# Patient Record
Sex: MALE | Race: OTHER
[De-identification: names, ages, dates, MRNs, and addresses within clinical notes are randomized per-mention and may not be internally consistent; named-entity substitution may affect disease eponyms.]

---

## 2020-06-10 ENCOUNTER — HOSPITAL ENCOUNTER (EMERGENCY)
Dept: HOSPITAL 76 - ED | Age: 65
LOS: 1 days | Discharge: HOME | End: 2020-06-11
Payer: COMMERCIAL

## 2020-06-10 ENCOUNTER — HOSPITAL ENCOUNTER (OUTPATIENT)
Dept: HOSPITAL 76 - EMS | Age: 65
Discharge: ANOTHER HOSPITAL | End: 2020-06-10
Attending: SURGERY
Payer: COMMERCIAL

## 2020-06-10 DIAGNOSIS — R41.82: Primary | ICD-10-CM

## 2020-06-10 DIAGNOSIS — R11.2: ICD-10-CM

## 2020-06-10 DIAGNOSIS — I10: ICD-10-CM

## 2020-06-10 DIAGNOSIS — R00.0: ICD-10-CM

## 2020-06-10 DIAGNOSIS — F12.921: Primary | ICD-10-CM

## 2020-06-10 DIAGNOSIS — I44.4: ICD-10-CM

## 2020-06-10 DIAGNOSIS — I49.1: ICD-10-CM

## 2020-06-10 LAB
ALBUMIN DIAFP-MCNC: 4.5 G/DL (ref 3.2–5.5)
ALBUMIN/GLOB SERPL: 1.6 {RATIO} (ref 1–2.2)
ALP SERPL-CCNC: 45 IU/L (ref 42–121)
ALT SERPL W P-5'-P-CCNC: 24 IU/L (ref 10–60)
ANION GAP SERPL CALCULATED.4IONS-SCNC: 10 MMOL/L (ref 6–13)
AST SERPL W P-5'-P-CCNC: 30 IU/L (ref 10–42)
BASOPHILS NFR BLD AUTO: 0.1 10^3/UL (ref 0–0.1)
BASOPHILS NFR BLD AUTO: 0.4 %
BILIRUB BLD-MCNC: 0.5 MG/DL (ref 0.2–1)
BUN SERPL-MCNC: 15 MG/DL (ref 6–20)
CALCIUM UR-MCNC: 9.1 MG/DL (ref 8.5–10.3)
CHLORIDE SERPL-SCNC: 102 MMOL/L (ref 101–111)
CO2 SERPL-SCNC: 27 MMOL/L (ref 21–32)
CREAT SERPLBLD-SCNC: 1.1 MG/DL (ref 0.6–1.2)
EOSINOPHIL # BLD AUTO: 0.1 10^3/UL (ref 0–0.7)
EOSINOPHIL NFR BLD AUTO: 0.9 %
ERYTHROCYTE [DISTWIDTH] IN BLOOD BY AUTOMATED COUNT: 13.7 % (ref 12–15)
GLOBULIN SER-MCNC: 2.9 G/DL (ref 2.1–4.2)
GLUCOSE SERPL-MCNC: 228 MG/DL (ref 70–100)
HGB UR QL STRIP: 15.1 G/DL (ref 14–18)
LIPASE SERPL-CCNC: 22 U/L (ref 22–51)
LYMPHOCYTES # SPEC AUTO: 2.1 10^3/UL (ref 1.5–3.5)
LYMPHOCYTES NFR BLD AUTO: 17.7 %
MCH RBC QN AUTO: 31.5 PG (ref 27–31)
MCHC RBC AUTO-ENTMCNC: 33.9 G/DL (ref 32–36)
MCV RBC AUTO: 92.9 FL (ref 80–94)
MONOCYTES # BLD AUTO: 0.7 10^3/UL (ref 0–1)
MONOCYTES NFR BLD AUTO: 5.9 %
NEUTROPHILS # BLD AUTO: 8.6 10^3/UL (ref 1.5–6.6)
NEUTROPHILS # SNV AUTO: 11.6 X10^3/UL (ref 4.8–10.8)
NEUTROPHILS NFR BLD AUTO: 74.4 %
PDW BLD AUTO: 10.1 FL (ref 7.4–11.4)
PLATELET # BLD: 251 10^3/UL (ref 130–450)
PROT SPEC-MCNC: 7.4 G/DL (ref 6.7–8.2)
RBC MAR: 4.8 10^6/UL (ref 4.7–6.1)
SODIUM SERPLBLD-SCNC: 139 MMOL/L (ref 135–145)

## 2020-06-10 PROCEDURE — 99284 EMERGENCY DEPT VISIT MOD MDM: CPT

## 2020-06-10 PROCEDURE — 80053 COMPREHEN METABOLIC PANEL: CPT

## 2020-06-10 PROCEDURE — 83690 ASSAY OF LIPASE: CPT

## 2020-06-10 PROCEDURE — 84484 ASSAY OF TROPONIN QUANT: CPT

## 2020-06-10 PROCEDURE — 93005 ELECTROCARDIOGRAM TRACING: CPT

## 2020-06-10 PROCEDURE — 36415 COLL VENOUS BLD VENIPUNCTURE: CPT

## 2020-06-10 PROCEDURE — 96361 HYDRATE IV INFUSION ADD-ON: CPT

## 2020-06-10 PROCEDURE — 85025 COMPLETE CBC W/AUTO DIFF WBC: CPT

## 2020-06-10 PROCEDURE — 96360 HYDRATION IV INFUSION INIT: CPT

## 2020-06-10 NOTE — ED PHYSICIAN DOCUMENTATION
History of Present Illness





- Stated complaint


Stated Complaint: N/V





- Chief complaint


Chief Complaint: General





- History obtained from


History obtained from: Patient, Family, EMS





- History of Present Illness


Timing: Today





- Additonal information


Additional information: 





65-year-old male began to feel quite abnormal began to have excessive 

diaphoresis and difficulty concentrating and he eventually vomited.  He called 

his friend Dr. Hammond and when he did not have improvement with hydration Dr. Hammond came to evaluate the patient and found him to be diaphoretic and not 

thinking clearly.  A granddaughter present indicated that she had made some 

cannabis brownies usually containing about 200 mg of THC and one brownie.  She 

states she eats 1/4 of one of these brownies and her father unknowingly ate the 

entire brownie.  He does not have experience with cannabis or other drugs.  He 

does not feel well.





Review of Systems


Constitutional: reports: Fatigue, Sweats.  denies: Fever


Ears: denies: Ear pain


Nose: denies: Congestion


Throat: denies: Sore throat


Cardiac: denies: Chest pain / pressure, Palpitations


Respiratory: denies: Dyspnea, Cough


GI: reports: Nausea, Vomiting


: denies: Dysuria


Skin: denies: Rash


Musculoskeletal: denies: Neck pain, Back pain, Extremity pain


Neurologic: reports: Confused.  denies: Generalized weakness, Focal weakness, 

Numbness, Headache, Head injury, LOC


Psychiatric: reports: Hallucinations





PD PAST MEDICAL HISTORY





- Past Medical History


Cardiovascular: Hypertension


Respiratory: None


Neuro: None


Endocrine/Autoimmune: None


GI: None


: None


HEENT: None


Psych: None


Musculoskeletal: None


Derm: None





- Past Surgical History


Past Surgical History: No





- Present Medications


Home Medications: 


                                Ambulatory Orders











 Medication  Instructions  Recorded  Confirmed


 


Telmisartan/Hydrochlorothiazid 1 each PO DAILY 06/10/20 06/10/20





[Telmisartan-Hctz 40-12.5 mg Tb]   














- Allergies


Allergies/Adverse Reactions: 


                                    Allergies











Allergy/AdvReac Type Severity Reaction Status Date / Time


 


No Known Drug Allergies Allergy   Verified 06/10/20 17:50














- Social History


Does the pt smoke?: No


Smoking Status: Never smoker


Does the pt drink ETOH?: Yes


Does the pt have substance abuse?: No





- Immunizations


Immunizations are current?: Yes





PD ED PE NORMAL





- Vitals


Vital signs reviewed: Yes (NORMAL )





- General


General: No acute distress, Well developed/nourished, Other (The patient is 

withdrawn prefers to sit with his eyes closed and is interactive only when 

specifically requested.)





- HEENT


HEENT: Atraumatic, PERRL, EOMI





- Neck


Neck: Supple, no meningeal sign, No bony TTP





- Cardiac


Cardiac: No murmur, Other (irregular)





- Respiratory


Respiratory: No respiratory distress, Clear bilaterally





- Abdomen


Abdomen: Soft, Non tender





- Back


Back: No CVA TTP, No spinal TTP





- Derm


Derm: Normal color, Warm and dry, No rash





- Extremities


Extremities: No deformity, No edema





- Neuro


Neuro: Alert and oriented X 3, CNs 2-12 intact, No motor deficit, No sensory 

deficit, Normal speech


Eye Opening: To Voice


Motor: Obeys Commands


Verbal: Oriented


GCS Score: 14





- Psych


Psych: Other (mood is withdrawn and the affect is flat )





Results





- Vitals


Vitals: 


                               Vital Signs - 24 hr











  06/10/20 06/10/20 06/10/20





  17:50 17:58 19:15


 


Temperature 35.6 C L  


 


Heart Rate 97 99 84


 


Respiratory 22 16 16





Rate   


 


Blood Pressure 108/65 141/74 H 124/92 H


 


O2 Saturation 93 95 98








                                     Oxygen











O2 Source                      Room air

















- EKG (time done)


  ** 1800


Rate: Rate (enter#) (112)


Rhythm: LAE, Other (frequent PAC's)


Axis: LAD


QRS: Poor R wave progression


Compare to prior EKG: Old EKG unavailable


Computer interpretation: Agree with computer





- Labs


Labs: 


                                Laboratory Tests











  06/10/20 06/10/20 06/10/20





  18:16 18:16 18:16


 


WBC  11.6 H  


 


RBC  4.80  


 


Hgb  15.1  


 


Hct  44.6  


 


MCV  92.9  


 


MCH  31.5 H  


 


MCHC  33.9  


 


RDW  13.7  


 


Plt Count  251  


 


MPV  10.1  


 


Neut # (Auto)  8.6 H  


 


Lymph # (Auto)  2.1  


 


Mono # (Auto)  0.7  


 


Eos # (Auto)  0.1  


 


Baso # (Auto)  0.1  


 


Absolute Nucleated RBC  0.00  


 


Nucleated RBC %  0.0  


 


Sodium   139 


 


Potassium   3.7 


 


Chloride   102 


 


Carbon Dioxide   27 


 


Anion Gap   10.0 


 


BUN   15 


 


Creatinine   1.1 


 


Estimated GFR (MDRD)   68 L 


 


Glucose   228 H 


 


Calcium   9.1 


 


Total Bilirubin   0.5 


 


AST   30 


 


ALT   24 


 


Alkaline Phosphatase   45 


 


Troponin I High Sens    3.3


 


Total Protein   7.4 


 


Albumin   4.5 


 


Globulin   2.9 


 


Albumin/Globulin Ratio   1.6 


 


Lipase   22 














PD MEDICAL DECISION MAKING





- ED course


Complexity details: reviewed results, re-evaluated patient, considered 

differential, d/w patient, d/w family


ED course: 





65-year-old male with very little experience with cannabis has ingested a 

significant quantity unbeknownst to him and now feels quite abnormal.  He has 

ingested as much as 200 mg of THC which is a high dose, a typical edible will 

have 10mg per "dose".  He has become ill with this is become diaphoretic and he 

has having trouble concentrating.  Is brought into the emergency department IV 

saline running and we will place him while in observation until he improves.





Departure





- Departure


Clinical Impression: 


 Cannabis intoxication delirium





Condition: Stable


Instructions:  ED Marijuana Abuse

## 2020-06-11 VITALS — DIASTOLIC BLOOD PRESSURE: 79 MMHG | SYSTOLIC BLOOD PRESSURE: 122 MMHG

## 2020-06-11 NOTE — ED PHYSICIAN DOCUMENTATION
ED Addendum





- Addendum


Addendum: 





06/11/20 00:49


The patient had been turned over to me on change of shift.  He apparently had 

unintentionally ingested some cannabis edibles.  He is having altered mentation 

and off balance for walking.  He was still feeling of bit lightheaded and 

confused on my initial exam.  However over the next few hours the patient had 

gradual improvement and subsequently was able to be up and around and ambulatory

and felt sturdy enough to at home.  No other symptoms have developed.  His wife 

is with him and feels comfortable taking him home at this point.





Diagnosis accidental ingestion of cannabis with altered mental status





Disposition the patient is discharged home in stable condition

## 2022-10-05 ENCOUNTER — HOSPITAL ENCOUNTER (OUTPATIENT)
Dept: HOSPITAL 76 - SDS | Age: 67
Discharge: HOME | End: 2022-10-05
Attending: SURGERY
Payer: MEDICARE

## 2022-10-05 VITALS — SYSTOLIC BLOOD PRESSURE: 134 MMHG | DIASTOLIC BLOOD PRESSURE: 87 MMHG

## 2022-10-05 DIAGNOSIS — R19.5: Primary | ICD-10-CM

## 2022-10-05 DIAGNOSIS — K57.30: ICD-10-CM

## 2022-10-05 DIAGNOSIS — K64.8: ICD-10-CM

## 2022-10-05 DIAGNOSIS — K63.5: ICD-10-CM

## 2022-10-05 DIAGNOSIS — E11.9: ICD-10-CM

## 2022-10-05 PROCEDURE — 45380 COLONOSCOPY AND BIOPSY: CPT

## 2022-10-05 PROCEDURE — 0DBN8ZZ EXCISION OF SIGMOID COLON, VIA NATURAL OR ARTIFICIAL OPENING ENDOSCOPIC: ICD-10-PCS | Performed by: SURGERY

## 2022-10-05 NOTE — ANESTHESIA
Pre-Anesthesia VS, & Labs





- Diagnosis





screening colonoscopy





- Procedure





colonoscopy


Vital Signs: 





                                        











Temp Pulse Resp BP Pulse Ox O2 Flow Rate


 


 36.1 C L  85   16   132/84 H  97    


 


 10/05/22 06:39  10/05/22 06:39  10/05/22 06:39  10/05/22 06:39  10/05/22 06:39 

 











Height: 6 ft


Weight (kg): 93.3 kg


Body Mass Index: 27.8


BMI Classification: Overweight





- NPO


>8 hours





- Lab Results


Current Lab Results: 





Laboratory Tests





10/05/22 06:57: POC Whole Bld Glucose 150 H











Home Medications and Allergies


Home Medications: 


Ambulatory Orders





metFORMIN [Glucophage] 500 mg PO BIDWM 10/04/22 











                                        





Telmisartan/Hydrochlorothiazid [Telmisartan-Hctz 40-12.5 mg Tb] 1 each PO DAILY 

06/10/20 


metFORMIN [Glucophage] 500 mg PO BIDWM 10/04/22 








Allergies/Adverse Reactions: 


                                    Allergies











Allergy/AdvReac Type Severity Reaction Status Date / Time


 


No Known Drug Allergies Allergy   Verified 06/10/20 17:50














Anes History & Medical History





- Anesthetic History


Anesthesia Complications: reports: No previous complications





- Medical History


Cardiovascular: reports: Hypertension


Pulmonary: reports: None


Gastrointestinal: reports: None


Urinary: reports: None


Neuro: reports: None


Musculoskeletal: reports: None


Endocrine/Autoimmune: reports: Type 2 diabetes


Blood Disorders: reports: None


Skin: reports: None


Smoking Status: Never smoker


Psychosocial: reports: No issues indicated


History of Cancer?: No





- Surgical History


General: reports: Colonoscopy





Exam


General: Alert, Oriented x3, Cooperative, No acute distress


Dental: WNL


Mouth Opening: 3 Fingerbreadth


Neck Mobility: Normal


Mallampati classification: II


Thyromental Distance: 4-6 cm


Mental/Cognitive Status: Alert/Oriented X3, Normal for patient





Plan


Anesthesia Type: General, Total IV


Consent for Procedure(s) Verified and Reviewed: Yes


Code Status: Attempt Resuscitation


ASA classification: 2-Mild systemic disease


Is this case an emergency?: No

## 2022-10-05 NOTE — ANESTHESIA POST OP EVALUATION
Anesthesia Post Eval





- Post Anesthesia Eval


Vitals: 





                                Last Vital Signs











Temp  36 C L  10/05/22 09:04


 


Pulse  80   10/05/22 09:04


 


Resp  16   10/05/22 09:04


 


BP  134/87 H  10/05/22 09:04


 


Pulse Ox  99   10/05/22 09:04


 


O2 Flow Rate      











CV Function Including HR & BP: Stable


Pain Control: Satisfactory


Nausea & Vomiting: Negative


Mental Status: Baseline


Respiratory Status: Airway Patent


Hydration Status: Satisfactory


Anesthesia Complications: None